# Patient Record
(demographics unavailable — no encounter records)

---

## 2024-11-13 NOTE — HISTORY OF PRESENT ILLNESS
[FreeTextEntry1] : This is a 58 yo F who presents for neurosurgical revisit with regards to acute on chronic low back pain/soreness with numbness/tingling into the bilateral feet. Symptoms worse when seated and she reports that different surfaces exacerbate her pain (such as hard surfaces or when reclining). At times, she cannot stand or ambulate for prolonged periods of time due to numbness and tingling into the feet.  As review, she has a past surgical history of right L4-5 microdiscectomy which took place in December 2022.  Postprocedure she had noted significant improvements in her pain and overall function.  She is frustrated and concerned with regards to her current presentation as this is a deviation from baseline.  IMAGING X- ray L spine: 10/28/2024 [facility]- mild degenerative changes, facet arthrosis. Grade 1 spondylolisthesis at L2-3 L4-5. No acute compress fx.  PHYSICAL EXAM:   Constitutional: Well appearing, no distress HEENT: Normocephalic Atraumatic Psychiatric: Alert and oriented to all spheres, normal mood Pulmonary: No respiratory distress  Neurologic:  CN II-XII grossly intact Palpation: (+) lumbar paravertebral tenderness to palpation Strength: Full strength in all major muscle groups Sensation: Full sensation to light touch in all extremities Reflexes:   2+ patellar  2+ ankle jerk  ROM pain limited with extension  Signs: SLR negative  Gait: steady, walking w/o assistance.

## 2024-11-13 NOTE — ASSESSMENT
[FreeTextEntry1] : This is a 57-year-old female who presents for neurosurgical revisit with regards to acute on chronic lumbar radiculopathy.  Patient warrants updated imaging and testing considering her persistent complaints despite provider guided exercise treatments over the previous month.  Additionally, x-ray testing reveals evidence of degenerative disc changes along with spondylolisthesis L2-3, L3-4.  MRI of the lumbar spine will further clarify with regards to central or neuroforaminal stenosis leading to her complaints.  EMG testing pending as well.  She is to complete the recommended imaging and testing and will return to the office in approximately 4 weeks for additional discussion with regards to her diagnosis as well as treatment plan.  MARTINA Bailey MD

## 2025-01-09 NOTE — DATA REVIEWED
[FreeTextEntry1] : 9/5/24 chol 192, hdl 45 L, trig 102, ldl 127, glucose 164, hgba1c 8.8 H, microalbumin 2,  TSH 1.54,

## 2025-01-09 NOTE — PHYSICAL EXAM
[Alert] : alert [Well Nourished] : well nourished [Obese] : obese [No Acute Distress] : no acute distress [Well Developed] : well developed [Normal Sclera/Conjunctiva] : normal sclera/conjunctiva [EOMI] : extra ocular movement intact [PERRL] : pupils equal, round and reactive to light [No Proptosis] : no proptosis [Normal Outer Ear/Nose] : the ears and nose were normal in appearance [Normal Hearing] : hearing was normal [Normal Oropharynx] : the oropharynx was normal [Supple] : the neck was supple [Thyroid Not Enlarged] : the thyroid was not enlarged [No Respiratory Distress] : no respiratory distress [No Accessory Muscle Use] : no accessory muscle use [Normal Rate and Effort] : normal respiratory rate and effort [Clear to Auscultation] : lungs were clear to auscultation bilaterally [Normal S1, S2] : normal S1 and S2 [Normal Rate] : heart rate was normal [Regular Rhythm] : with a regular rhythm [Carotids Normal] : carotid pulses were normal with no bruits breastfeeding exclusively [No Edema] : no peripheral edema [Pedal Pulses Normal] : the pedal pulses are present [Normal Bowel Sounds] : normal bowel sounds [Not Tender] : non-tender [Not Distended] : not distended [Soft] : abdomen soft [Normal Anterior Cervical Nodes] : no anterior cervical lymphadenopathy [Normal Posterior Cervical Nodes] : no posterior cervical lymphadenopathy [No CVA Tenderness] : no ~M costovertebral angle tenderness [No Spinal Tenderness] : no spinal tenderness [Spine Straight] : spine straight [No Stigmata of Cushings Syndrome] : no stigmata of Cushings Syndrome [Normal Gait] : normal gait [Normal Strength/Tone] : muscle strength and tone were normal [No Rash] : no rash [No Motor Deficits] : the motor exam was normal [Normal Reflexes] : deep tendon reflexes were 2+ and symmetric [No Tremors] : no tremors [Oriented x3] : oriented to person, place, and time [Normal Affect] : the affect was normal [Normal Mood] : the mood was normal [Kyphosis] : no kyphosis present [Scoliosis] : no scoliosis [Acanthosis Nigricans] : no acanthosis nigricans [de-identified] : Dm2, Obesity

## 2025-01-09 NOTE — REASON FOR VISIT
[Initial Evaluation] : an initial evaluation [DM Type 2] : DM Type 2 [Weight Management/Obesity] : weight management/obesity [FreeTextEntry2] : Dr. Nichols

## 2025-01-09 NOTE — ASSESSMENT
[Diabetes Foot Care] : diabetes foot care [Long Term Vascular Complications] : long term vascular complications of diabetes [Carbohydrate Consistent Diet] : carbohydrate consistent diet [Importance of Diet and Exercise] : importance of diet and exercise to improve glycemic control, achieve weight loss and improve cardiovascular health [Exercise/Effect on Glucose] : exercise/effect on glucose [Hypoglycemia Management] : hypoglycemia management [Ketone Testing] : ketone testing [Self Monitoring of Blood Glucose] : self monitoring of blood glucose [Retinopathy Screening] : Patient was referred to ophthalmology for retinopathy screening [Weight Loss] : weight loss [Diabetic Medications] : Risks and benefits of diabetic medications were discussed [FreeTextEntry4] : 1600 car ada diet [FreeTextEntry1] : 1. DM2/ Obesity 1600 car ada/ exercise glipizide 10mg daily will take if glucose over 150 start Farxiga 5 mg daily Janumet 50/1000 mg after meals bid

## 2025-01-09 NOTE — PAST MEDICAL HISTORY
[Postmenopausal] : The patient is postmenopausal [Menopause Age____] : age at menopause was [unfilled] [Definite ___ (Date)] : the last menstrual period was [unfilled] [Total Preg ___] : G[unfilled]

## 2025-04-14 NOTE — ASSESSMENT
[Diabetes Foot Care] : diabetes foot care [Long Term Vascular Complications] : long term vascular complications of diabetes [Carbohydrate Consistent Diet] : carbohydrate consistent diet [Importance of Diet and Exercise] : importance of diet and exercise to improve glycemic control, achieve weight loss and improve cardiovascular health [Exercise/Effect on Glucose] : exercise/effect on glucose [Hypoglycemia Management] : hypoglycemia management [Ketone Testing] : ketone testing [Self Monitoring of Blood Glucose] : self monitoring of blood glucose [Sick-Day Management] : sick-day management [Retinopathy Screening] : Patient was referred to ophthalmology for retinopathy screening [Weight Loss] : weight loss [Diabetic Medications] : Risks and benefits of diabetic medications were discussed [Other____] : [unfilled] [FreeTextEntry4] : 1600 car ada diet, exercise [FreeTextEntry1] : 1. DM2/ Obesity 1600 car ada/ exercise glipizide 10mg daily will take if glucose over 150 stop Farxiga 5 mg daily Janumet 50/1000 mg after meals bid start pioglitazone 30 mg daily

## 2025-04-14 NOTE — DATA REVIEWED
[FreeTextEntry1] : 4/14/25 [facility]: microalbumin 6, chol 207H, hdl 44L, trig 120, ldld 139 H, non hdl 163 H, glucose 173H, gfr 95, hgba1c 9.9H, TSH 2.08, ft4 1.5, vit d 35

## 2025-04-14 NOTE — HISTORY OF PRESENT ILLNESS
[Finger Stick] : Finger Stick: Yes [Hypoglycemia] : Patient is not hypoglycemic. [FreeTextEntry9] : [id number] [FreeTextEntry1] : testing 1-3 x a day

## 2025-04-14 NOTE — PHYSICAL EXAM
[Alert] : alert [Well Nourished] : well nourished [Obese] : obese [No Acute Distress] : no acute distress [Well Developed] : well developed [Normal Sclera/Conjunctiva] : normal sclera/conjunctiva [EOMI] : extra ocular movement intact [PERRL] : pupils equal, round and reactive to light [No Proptosis] : no proptosis [Normal Outer Ear/Nose] : the ears and nose were normal in appearance [Normal Hearing] : hearing was normal [Normal Oropharynx] : the oropharynx was normal [Supple] : the neck was supple [Thyroid Not Enlarged] : the thyroid was not enlarged [No Respiratory Distress] : no respiratory distress [No Accessory Muscle Use] : no accessory muscle use [Normal Rate and Effort] : normal respiratory rate and effort [Clear to Auscultation] : lungs were clear to auscultation bilaterally [Normal S1, S2] : normal S1 and S2 [Normal Rate] : heart rate was normal [Regular Rhythm] : with a regular rhythm [Carotids Normal] : carotid pulses were normal with no bruits [No Edema] : no peripheral edema [Pedal Pulses Normal] : the pedal pulses are present [Normal Bowel Sounds] : normal bowel sounds [Not Tender] : non-tender [Not Distended] : not distended [Soft] : abdomen soft [Normal Anterior Cervical Nodes] : no anterior cervical lymphadenopathy [No CVA Tenderness] : no ~M costovertebral angle tenderness [No Spinal Tenderness] : no spinal tenderness [Spine Straight] : spine straight [Kyphosis] : no kyphosis present [Scoliosis] : no scoliosis [No Stigmata of Cushings Syndrome] : no stigmata of Cushings Syndrome [Normal Gait] : normal gait [Normal Strength/Tone] : muscle strength and tone were normal [No Rash] : no rash [Acanthosis Nigricans] : no acanthosis nigricans [No Motor Deficits] : the motor exam was normal [Normal Reflexes] : deep tendon reflexes were 2+ and symmetric [No Tremors] : no tremors [Oriented x3] : oriented to person, place, and time [Normal Affect] : the affect was normal [Normal Mood] : the mood was normal [de-identified] : BMI 37.9 [de-identified] : Dm2, morbid Obesity

## 2025-07-21 NOTE — DATA REVIEWED
[FreeTextEntry1] : 7/7/25 microalbumin 12, chol 194, hdl 42 L, trig 112, ldld 130, non 152 glucose 166 H gfr 89, hgba1c 8.3, tsh 1.47, ft4 1.4 plateslet 447, vit 350 c dandre tide 2.69, vit d 31

## 2025-07-21 NOTE — ASSESSMENT
[Diabetes Foot Care] : diabetes foot care [Long Term Vascular Complications] : long term vascular complications of diabetes [Carbohydrate Consistent Diet] : carbohydrate consistent diet [Importance of Diet and Exercise] : importance of diet and exercise to improve glycemic control, achieve weight loss and improve cardiovascular health [Exercise/Effect on Glucose] : exercise/effect on glucose [Hypoglycemia Management] : hypoglycemia management [Ketone Testing] : ketone testing [Self Monitoring of Blood Glucose] : self monitoring of blood glucose [Injection Technique, Storage, Sharps Disposal] : injection technique, storage, and sharps disposal [Sick-Day Management] : sick-day management [Retinopathy Screening] : Patient was referred to ophthalmology for retinopathy screening [Weight Loss] : weight loss [Diabetic Medications] : Risks and benefits of diabetic medications were discussed [FreeTextEntry4] : 1600 car ada diet, exercise [FreeTextEntry1] : 1. DM2/ Obesity 1600 car ada/ exercise glipizide 10mg daily will take if glucose over 150 stop Farxiga 5 mg daily Janumet 50/1000 mg after meals bid patient refused to take - pioglitazone 30 mg daily patient advise to buy Vitamin B complex

## 2025-07-21 NOTE — PHYSICAL EXAM
[Alert] : alert [Well Nourished] : well nourished [Obese] : obese [No Acute Distress] : no acute distress [Well Developed] : well developed [Normal Sclera/Conjunctiva] : normal sclera/conjunctiva [EOMI] : extra ocular movement intact [PERRL] : pupils equal, round and reactive to light [No Proptosis] : no proptosis [Normal Outer Ear/Nose] : the ears and nose were normal in appearance [Normal Hearing] : hearing was normal [Normal Oropharynx] : the oropharynx was normal [Supple] : the neck was supple [Thyroid Not Enlarged] : the thyroid was not enlarged [No Respiratory Distress] : no respiratory distress [No Accessory Muscle Use] : no accessory muscle use [Normal Rate and Effort] : normal respiratory rate and effort [Clear to Auscultation] : lungs were clear to auscultation bilaterally [Normal S1, S2] : normal S1 and S2 [Normal Rate] : heart rate was normal [Regular Rhythm] : with a regular rhythm [Carotids Normal] : carotid pulses were normal with no bruits [No Edema] : no peripheral edema [Pedal Pulses Normal] : the pedal pulses are present [Normal Bowel Sounds] : normal bowel sounds [Not Tender] : non-tender [Not Distended] : not distended [Soft] : abdomen soft [Normal Anterior Cervical Nodes] : no anterior cervical lymphadenopathy [No CVA Tenderness] : no ~M costovertebral angle tenderness [No Spinal Tenderness] : no spinal tenderness [Spine Straight] : spine straight [Kyphosis] : no kyphosis present [Scoliosis] : no scoliosis [No Stigmata of Cushings Syndrome] : no stigmata of Cushings Syndrome [Normal Gait] : normal gait [Normal Strength/Tone] : muscle strength and tone were normal [No Rash] : no rash [Acanthosis Nigricans] : no acanthosis nigricans [No Motor Deficits] : the motor exam was normal [Normal Reflexes] : deep tendon reflexes were 2+ and symmetric [No Tremors] : no tremors [Oriented x3] : oriented to person, place, and time [Normal Affect] : the affect was normal [Normal Mood] : the mood was normal [de-identified] : BMI 36.81 [de-identified] : Dm2, morbid Obesity

## 2025-07-21 NOTE — REASON FOR VISIT
[Follow - Up] : a follow-up visit [DM Type 2] : DM Type 2 [Weight Management/Obesity] : weight management/obesity [FreeTextEntry2] : Simone

## 2025-07-21 NOTE — THERAPY
[Today's Date] : [unfilled] [BG Target = ____] : BG Target = [unfilled] [FreeTextEntry7] : glipizide ER 10, Janumet 50/1000 mg

## 2025-07-21 NOTE — HISTORY OF PRESENT ILLNESS
[Finger Stick] : Finger Stick: Yes [Hypoglycemia] : Patient is not hypoglycemic. [FreeTextEntry9] :  [FreeTextEntry1] :  testing 1-3 x a day